# Patient Record
Sex: MALE | Race: WHITE | NOT HISPANIC OR LATINO | Employment: OTHER | URBAN - METROPOLITAN AREA
[De-identification: names, ages, dates, MRNs, and addresses within clinical notes are randomized per-mention and may not be internally consistent; named-entity substitution may affect disease eponyms.]

---

## 2017-06-14 NOTE — PATIENT DISCUSSION
LUCENTIS TX AND REPEAT EVERY 5 WKS X 2 - MILD EDEMA, VA DROPPED FROM 20/30 TO CF SINCE LAST L TX ABOUT 2 YEARS AGO - PT MISSED F/U AS HE HAS HAD HEALTH ISSUES - TO RESUME TX TO SEE IF IT IS POSSIBLE TO RECOVER SOME OF THE VA.

## 2017-06-14 NOTE — PROCEDURE NOTE: CLINICAL

## 2017-08-09 NOTE — PATIENT DISCUSSION
INCREASED - DID NOT SEE DEWAYNE AS DR Kim Karen OFFICE DOES NOT HAVE OXYGEN.  TO START LATANAPROST 8 9 17.

## 2017-08-09 NOTE — PATIENT DISCUSSION
ON 6 14 17 - LUCENTIS TX AND REPEAT EVERY 5 WKS X 2 - MILD EDEMA, VA DROPPED FROM 20/30 TO CF SINCE LAST L TX ABOUT 2 YEARS AGO - PT MISSED F/U AS HE HAS HAD HEALTH ISSUES - TO RESUME TX TO SEE IF IT IS POSSIBLE TO RECOVER SOME OF THE VA.

## 2017-08-16 NOTE — PROCEDURE NOTE: CLINICAL
PROCEDURE NOTE: Lucentis 0.5mg PFS OD. Diagnosis: Neovascular AMD with Active CNV. Anesthesia: Topical. Prep: Betadine Flush. Prior to injection, risks/benefits/alternatives discussed including but not limited to infection, loss of vision or eye, hemorrhage, cataract, glaucoma, retinal tears or detachment. The patient wished to proceed with treatment. Topical anesthesia was induced with Alcaine. Additional anesthesia was achieved using drop(s) or injection checked above. A drop of Povidone-iodine 5% ophthalmic solution was instilled over the injection site and in the inferior fornix. Betadine prep was performed. A single use prefilled syringe of intravitreal Lucentis 0.5mg/0.05ml was used and excess discarded. The needle was passed 3.0 mm posterior to the limbus in pseudophakic patients, and 3.5 mm posterior to the limbus in phakic patients. The remainder of the Lucentis 0.5mg in the single-use vial was then discarded in a medical waste disposal container. The eye was irrigated with sterile irrigating solution. Patient tolerated the procedure well. There were no complications. Post procedure instructions given. CF vision checked. Injection Time 235PM. The patient was instructed to return for re-evaluation in approximately 4-12 weeks depending on his/her condition and was told to call immediately if vision decreases and/or if his/her eye becomes red, painful, and/or light sensitive. The patient was instructed to go to the emergency room or call 911 if unable to reach the doctor within an hour or two of trying or calling. Martinez Buck

## 2017-08-16 NOTE — PATIENT DISCUSSION
INCREASED - DID NOT SEE DEWAYNE AS DR Ursula Campbell OFFICE DOES NOT HAVE OXYGEN.  TO START LATANAPROST 8 9 17.

## 2017-09-25 NOTE — PROCEDURE NOTE: CLINICAL
PROCEDURE NOTE: Lucentis 0.5mg PFS OD. Diagnosis: Neovascular AMD with Active CNV. Anesthesia: Akten Gel 3.5%. Prep: Betadine Flush. Prior to injection, risks/benefits/alternatives discussed including but not limited to infection, loss of vision or eye, hemorrhage, cataract, glaucoma, retinal tears or detachment. The patient wished to proceed with treatment. Topical anesthesia was induced with Alcaine. Additional anesthesia was achieved using drop(s) or injection checked above. A drop of Povidone-iodine 5% ophthalmic solution was instilled over the injection site and in the inferior fornix. Betadine prep was performed. A single use prefilled syringe of intravitreal Lucentis 0.5mg/0.05ml was used and excess discarded. The needle was passed 3.0 mm posterior to the limbus in pseudophakic patients, and 3.5 mm posterior to the limbus in phakic patients. The remainder of the Lucentis 0.5mg in the single-use vial was then discarded in a medical waste disposal container. The eye was irrigated with sterile irrigating solution. Patient tolerated the procedure well. There were no complications. Post procedure instructions given. CF vision checked. Injection Time 11:45AM. The patient was instructed to return for re-evaluation in approximately 4-12 weeks depending on his/her condition and was told to call immediately if vision decreases and/or if his/her eye becomes red, painful, and/or light sensitive. The patient was instructed to go to the emergency room or call 911 if unable to reach the doctor within an hour or two of trying or calling. Joyce Grimes

## 2017-10-30 NOTE — PATIENT DISCUSSION
INCREASED - DID NOT SEE DEWAYNE AS DR Ursula Campbell OFFICE DOES NOT HAVE OXYGEN.  LATANAPROST STARTED8 9 17.

## 2017-10-30 NOTE — PATIENT DISCUSSION
NO SIG IMPROVEMENT IN VA WITH L TX AND 5 WKS F/U ON 10 30 17- REVIEWED OPTIONS WITH PATIENT OF CONTINUING TX VS FOLLOWING - AS NO SIG IMPROVMENT PT HAS ELECTED TO HOLD ON FURTHER TREATMENT.

## 2018-04-23 NOTE — PATIENT DISCUSSION
INCREASED - DID NOT SEE DEWAYNE AS DR Radha Funez OFFICE DOES NOT HAVE OXYGEN.  LATANAPROST STARTED8 9 17.

## 2018-04-23 NOTE — PATIENT DISCUSSION
IOP STILL ON HIGH SIDE - RECOM EVAL DR HIGGINS TO TRY AND PRESERVE HIS PERIPHERAL VA - HAD LASER IN [facility] 2018.

## 2018-08-13 ENCOUNTER — ESTABLISHED COMPREHENSIVE EXAM (OUTPATIENT)
Dept: URBAN - METROPOLITAN AREA CLINIC 43 | Facility: CLINIC | Age: 73
End: 2018-08-13

## 2018-08-13 DIAGNOSIS — H52.203: ICD-10-CM

## 2018-08-13 DIAGNOSIS — H52.4: ICD-10-CM

## 2018-08-13 PROCEDURE — 92015 DETERMINE REFRACTIVE STATE: CPT

## 2018-08-13 PROCEDURE — 92014 COMPRE OPH EXAM EST PT 1/>: CPT

## 2018-08-13 ASSESSMENT — TONOMETRY
OD_IOP_MMHG: 18
OS_IOP_MMHG: 17

## 2018-08-13 ASSESSMENT — VISUAL ACUITY
OD_CC: J2
OS_CC: J1
OD_CC: 20/25-2
OS_CC: 20/25
OD_BAT: 20/50
OS_BAT: 20/40

## 2018-10-22 NOTE — PATIENT DISCUSSION
4 23 18 - NO SIG IMPROVEMENT IN VA WITH L TX AND 5 WKS F/U ON 10 30 17- REVIEWED OPTIONS WITH PATIENT OF CONTINUING TX VS FOLLOWING - AS NO SIG IMPROVMENT PT HAS ELECTED TO HOLD ON FURTHER TREATMENT.

## 2018-10-22 NOTE — PATIENT DISCUSSION
IOP STILL ON HIGH SIDE - RECOM EVAL DR HIGGINS TO TRY AND PRESERVE HIS PERIPHERAL VA - HAD LASER IN [facility] 2018.

## 2018-10-22 NOTE — PATIENT DISCUSSION
INCREASED - DID NOT SEE DEWAYNE AS DR Munoz Neighbor OFFICE DOES NOT HAVE OXYGEN.  LATANAPROST STARTED8 9 17.

## 2019-04-24 NOTE — PATIENT DISCUSSION
INCREASED - DID NOT SEE DEWAYNE AS DR Harmeet Rapp OFFICE DOES NOT HAVE OXYGEN.  LATANAPROST STARTED8 9 17.

## 2019-04-24 NOTE — PATIENT DISCUSSION
IOP STILL ON HIGH SIDE - RECOM EVAL DR HIGGINS TO TRY AND PRESERVE HIS PERIPHERAL VA - HAD LASER IN [facility] 2018.

## 2019-06-04 NOTE — PATIENT DISCUSSION
INCREASED - DID NOT SEE DEWAYNE AS  [facility] OFFICE DOES NOT HAVE OXYGEN.  TO START LATANAPROST 8 9 17. Return to Urgent Care or go to ER if symptoms worsen or fail to improve.  Follow up with PCP as recommended for further management.       Diaper Rash, Candida (Infant/Toddler)     Areas where Candida diaper rash can form.   Candida is type of yeast. It grows best in warm, moist areas. It is common for Candida to grow in the skin folds under a childs diaper. When there is an overgrowth of Candida, it can cause a rash called a Candida diaper rash.  The entire area under the diaper may be bright red. The borders of the rash may be raised. There may be smaller patches that blend in with the larger rash. The rash may have small bumps and pimples filled with pus. The scrotum in boys may be very red and scaly. The area will itch and cause the child to be fussy.  Candida diaper rash is most often treated with over-the-counter antifungal cream or ointment. The rash should clear a few days after starting the medicine. Infections that dont go away may need a prescription medicine. In rare cases, a bacterial infection can also occur.  Home care  Medicines  Your childs healthcare provider will recommend an antifungal cream or ointment for the diaper rash. He or she may also prescribe a medicine to help relieve itching. Follow all instructions for giving these medicines to your child. Apply a thick layer of cream or ointment on the rash. It can be left on the skin between diaper changes. You can apply more cream or ointment on top, if the area is clean.  General care  Follow these tips when caring for your child:  · Be sure to wash your hands well with soap and warm water before and after changing your childs diaper and applying any medicine.  · Check for soiled diapers regularly. Change your childs diaper as soon as you notice it is soiled. Gently pat the area clean with a warm, wet soft cloth. If you use soap, it should be gentle and scent-free. Topical barriers such as zinc oxide paste or petroleum jelly can be liberally  applied to help prevent urine and stool contact with the skin.  · Change your childs diaper at least once at night. Put the diaper on loosely.   · Use a breathable cover for cloth diapers instead of rubber pants. Slit the elastic legs or cover of a disposable diaper in a few places. This will allow air to reach your childs skin. Note: Disposable diapers may be preferred until the rash has healed.  · Allow your child to go without a diaper for periods of time. Exposing the skin to air will help it to heal.  · Dont overclean the affected skin areas. This can irritate the skin further. Also dont apply powders such as talc or cornstarch to the affected skin areas. Talc can be harmful to a childs lungs. Cornstarch can cause the Candida infection to get worse.  Follow-up care  Follow up with your childs healthcare provider, or as directed.  When to seek medical advice  Unless your child's healthcare provider advises otherwise, call the provider right away if:  · Your child is 3 months old or younger and has a fever of 100.4°F (38°C) or higher. (Seek treatment right away. Fever in a young baby can be a sign of a serious infection.)  · Your child is younger than 2 years of age and has a fever of 100.4°F (38°C) that lasts for more than 1 day.  · Your child is 2 years old or older and has a fever of 100.4°F (38°C) that continues for more than 3 days.  · Your child is of any age and has repeated fevers above 104°F (40°C).  Also call the provider right away if:  · Your child is fussier than normal or keeps crying and can't be soothed.  · Your childs symptoms worsen, or they dont get better with treatment.  · Your child develops new symptoms such as blisters, open sores, raw skin, or bleeding.  · Your child has unusual or foul-smelling drainage in the affected skin areas.  Date Last Reviewed: 7/26/2015  © 0324-3176 [employer]. [address] [place] [address], [place] [address]. All rights reserved. This information  is not intended as a substitute for professional medical care. Always follow your healthcare professional's instructions.        Oral Candida Infection (Thrush) in Your Child  Candida is a type of fungus. It is found naturally on the skin and in the mouth. If Candida grows out of control, it can cause mouth infection called thrush. Thrush is common in infants and children. Thrush is not a serious problem for a healthy child.  Whos at risk?  Thrush is common in infants and toddlers. Risk factors for infant thrush include:  · Very low birth weight  · Passing through the birth canal of a mother with a yeast infection  · Use of antibiotics  · Use of inhaled steroids, such as for asthma  · Frequent use of a pacifier  · Weakened immune system  Symptoms of thrush  Thrush causes creamy white patches to form on the tongue or inner cheeks. These patches can be painful and may bleed. Babies with thrush are often fussy and may have trouble feeding.  Treatment for thrush  A healthy baby with mild thrush may not need any treatment. More severe cases are likely to be treated with a liquid antifungal medicine. Or the medicine may be given as lozenges or pills. Follow the healthcare provider's instructions for giving this medicine to your child.  Breastfeeding mothers may develop thrush on their nipples. If you breastfeed, both you and your child will be treated. This is to prevent passing the infection back and forth.  Caring for your child at home  Make sure to do the following:  · Wash your hands well with warm water and soap before and after caring for your child. Have your child wash his or her hands often.  · If your child uses a pacifier, boil it for 5 to 10 minutes at least once a day.  · Wash drinking cups well using warm water and soap after each use.  · If your child takes inhaled corticosteroids, have your child rinse his or her mouth after taking the medicine. Also ask the child's healthcare provider about using a  spacer. This can help lessen the risk for thrush.  Your child can likely go to school or , unless the healthcare provider says otherwise.  When to call the healthcare provider  Call the healthcare provider right away if:  · Your child is 3 months old or younger and has a fever of 100.4°F (38°C) or higher. Get medical care right away. Fever in a young baby can be a sign of a dangerous infection.  · Your child is younger than 2 years of age and has a fever of 100.4°F (38°C) that continues for more than 1 day.  · Your child is 2 years old or older and has a fever of 100.4°F (38°C) that continues for more than 3 days.  · Your child is of any age and has repeated fevers above 104°F (40°C).  Also call the healthcare provider if your child:  · Stops eating or drinking  · Has pain that doesnt go away, or gets worse  · Has other symptoms that get worse  · Has repeated thrush infections   Date Last Reviewed: 10/1/2016  © 2642-1558 [employer]. [address] [place] [address], [place] [address]. All rights reserved. This information is not intended as a substitute for professional medical care. Always follow your healthcare professional's instructions.

## 2019-07-31 ENCOUNTER — ESTABLISHED COMPREHENSIVE EXAM (OUTPATIENT)
Dept: URBAN - METROPOLITAN AREA CLINIC 43 | Facility: CLINIC | Age: 74
End: 2019-07-31

## 2019-07-31 DIAGNOSIS — H52.203: ICD-10-CM

## 2019-07-31 DIAGNOSIS — H52.4: ICD-10-CM

## 2019-07-31 PROCEDURE — 92014 COMPRE OPH EXAM EST PT 1/>: CPT

## 2019-07-31 PROCEDURE — 92015 DETERMINE REFRACTIVE STATE: CPT

## 2019-07-31 ASSESSMENT — VISUAL ACUITY
OS_CC: 20/25
OS_CC: J2
OD_CC: 20/40-2
OD_CC: J3
OS_SC: 20/80-1
OD_SC: 20/200
OD_SC: >J12
OS_SC: >J12

## 2019-07-31 ASSESSMENT — TONOMETRY
OS_IOP_MMHG: 18
OD_IOP_MMHG: 16

## 2020-06-02 NOTE — PATIENT DISCUSSION
INCREASED - DID NOT SEE DEWAYNE AS DR Knapp Sham OFFICE DOES NOT HAVE OXYGEN.  LATANAPROST STARTED8 9 17.

## 2020-06-02 NOTE — PATIENT DISCUSSION
Reviewed OPTIONS including AVASTIN/EYLEA/LUCENTIS/BEOVU and patient wants to proceed with LUCENTIS treatment AND REPEAT EVERY 29 DAYS X 3 DOI.

## 2020-06-02 NOTE — PROCEDURE NOTE: CLINICAL
PROCEDURE NOTE: Lucentis 0.5mg PFS OS. Diagnosis: Neovascular AMD with Active CNV. Anesthesia: Lidocaine. Prep: Betadine Flush. Prior to injection, risks/benefits/alternatives discussed including but not limited to infection, loss of vision or eye, hemorrhage, cataract, glaucoma, retinal tears or detachment. The patient wished to proceed with treatment. Topical anesthesia was induced with Alcaine. Additional anesthesia was achieved using drop(s) or injection checked above. A drop of Povidone-iodine 5% ophthalmic solution was instilled over the injection site and in the inferior fornix. Betadine prep was performed. A single use prefilled syringe of intravitreal Lucentis 0.5mg/0.05ml was used and excess was disposed of as waste. The needle was passed 3.0 mm posterior to the limbus in pseudophakic patients, and 3.5 mm posterior to the limbus in phakic patients. Injection Time 5:00PM. Patient tolerated the procedure well. There were no complications. The eye was irrigated with sterile irrigating solution. Post procedure instructions given. The patient was instructed to use Artificial Tears q.i.d. p.r.n for comfort. The patient was instructed to return for re-evaluation in approximately 4-12 weeks depending on his/her condition and was told to call immediately if vision decreases and/or if his/her eye becomes red, painful, and/or light sensitive. The patient was instructed to go to the emergency room or call 911 if unable to reach the doctor within an hour or two of trying or calling. Cortes Fitzgerald

## 2020-06-02 NOTE — PATIENT DISCUSSION
IOP STILL ON HIGH SIDE - RECOM EVAL DR HIGGINS TO TRY AND PRESERVE HIS PERIPHERAL VA - HAD LASER IN [facility] 2018.

## 2020-07-01 NOTE — PATIENT DISCUSSION
INCREASED - DID NOT SEE DEWAYNE AS DR Maryan Joaquin OFFICE DOES NOT HAVE OXYGEN.  LATANAPROST STARTED8 9 17.

## 2020-07-01 NOTE — PROCEDURE NOTE: CLINICAL
PROCEDURE NOTE: Lucentis 0.5mg PFS OS. Diagnosis: Neovascular AMD with Active CNV. Anesthesia: Akten Gel 3.5%. Prep: Betadine Flush. Prior to injection, risks/benefits/alternatives discussed including but not limited to infection, loss of vision or eye, hemorrhage, cataract, glaucoma, retinal tears or detachment. The patient wished to proceed with treatment. Topical anesthesia was induced with Alcaine. Additional anesthesia was achieved using drop(s) or injection checked above. A drop of Povidone-iodine 5% ophthalmic solution was instilled over the injection site and in the inferior fornix. Betadine prep was performed. A single use prefilled syringe of intravitreal Lucentis 0.5mg/0.05ml was used and excess was disposed of as waste. The needle was passed 3.0 mm posterior to the limbus in pseudophakic patients, and 3.5 mm posterior to the limbus in phakic patients. Injection Time 3:30PM. Patient tolerated the procedure well. There were no complications. The eye was irrigated with sterile irrigating solution. Post procedure instructions given. The patient was instructed to use Artificial Tears q.i.d. p.r.n for comfort. The patient was instructed to return for re-evaluation in approximately 4-12 weeks depending on his/her condition and was told to call immediately if vision decreases and/or if his/her eye becomes red, painful, and/or light sensitive. The patient was instructed to go to the emergency room or call 911 if unable to reach the doctor within an hour or two of trying or calling. Maine Snell

## 2020-07-01 NOTE — PATIENT DISCUSSION
IOP STILL ON HIGH SIDE - RECOM EVAL DR HIGGINS TO TRY AND PRESERVE HIS PERIPHERAL VA - HAD LASER IN [facility] 2018.

## 2020-07-31 ENCOUNTER — ESTABLISHED COMPREHENSIVE EXAM (OUTPATIENT)
Dept: URBAN - METROPOLITAN AREA CLINIC 43 | Facility: CLINIC | Age: 75
End: 2020-07-31

## 2020-07-31 DIAGNOSIS — Z01.00: ICD-10-CM

## 2020-07-31 DIAGNOSIS — H52.203: ICD-10-CM

## 2020-07-31 DIAGNOSIS — H52.4: ICD-10-CM

## 2020-07-31 PROCEDURE — 92015 DETERMINE REFRACTIVE STATE: CPT

## 2020-07-31 PROCEDURE — 92014 COMPRE OPH EXAM EST PT 1/>: CPT

## 2020-07-31 ASSESSMENT — VISUAL ACUITY
OD_CC: 20/25-1
OS_CC: 20/25
OD_SC: <J12
OS_SC: 20/70
OS_SC: J12-
OS_CC: J2
OD_CC: J1
OD_SC: 20/400

## 2020-07-31 ASSESSMENT — TONOMETRY
OS_IOP_MMHG: 08
OD_IOP_MMHG: 08

## 2020-08-03 NOTE — PATIENT DISCUSSION
INCREASED - DID NOT SEE DEWAYNE AS DR Lito Shafer OFFICE DOES NOT HAVE OXYGEN.  LATANAPROST STARTED8 9 17.

## 2020-08-03 NOTE — PATIENT DISCUSSION
Physical Exam  Mallampati: II  TM Distance: >3 FB  Neck ROM: Full  Cardio Rhythm: Regular  Cardio Rate: Normal  cardiovascular exam normal  Breath sounds clear to auscultation:  Yes  pulmonary exam normal  abdominal exam normal  Dental Note: Multiple missing, none loose per patient      Anesthesia Plan  ASA Status: 3  Anesthesia Type: General  Induction: Intravenous  Preferred Airway Type: LMA  Reviewed: EKG, Nursing Notes, Beta Blocker Status, Medications, Past Med History, NPO Status, Problem List, Allergies, Patient Summary, DNR Status, Consultations, Lab Results and Pre-Induction Reassessment  The proposed anesthetic plan, including its risks and benefits, have been discussed with the Patient - along with the risks and benefits of alternatives.  Questions were encouraged and answered and the patient and/or representative agrees to proceed.  Blood Products: Not Anticipated      Anesthesia ROS/Med Hx    Overall Review:  Pts. EKG was reviewed     Pulmonary Review:    Pt. positive for pneumonia  Pt. positive for COPD   Pt. positive for asthmaThe patient is a current smoker.     Neuro/Psych Review:  Neuro/Psych Comments: Chronic low back pain  Pt. positive for seizures  Pt. positive for neuromuscular disease (Fibromyalgia, restless leg syndrome)  Pt. positive for psychiatric history (Anxiety/depression, insomnia)    Cardiovascular Review:    Pt. positive for CAD  Pt. positive for hypertension  Pt. positive for hyperlipidemia    End/Other Review:    Pt. positive for diabetes  Pt. positive for arthritis  Overall Review of Systems Comments:  Patient Active Problem List:     Prepatellar bursitis     DM (diabetes mellitus) (CMS/HCC)     HTN (hypertension)     HLD (hyperlipidemia)     Opioid abuse     Tobacco use disorder     Seizure (CMS/HCC)     Acute respiratory failure with hypoxia and hypercapnia (CMS/[facility])     Closed trimalleolar fracture of right ankle     Syndesmotic disruption of right ankle     Skin necrosis right  Recommended OBSERVATION and continued MONITORING for progression. ankle     Nonhealing surgical wound      DM (diabetes mellitus) (CMS/HCC)                              HTN (hypertension)                                            HLD (hyperlipidemia)                                          COPD (chronic obstructive pulmonary disease) (*               Fibromyalgia                                                  Obesity                                                       Onychomycosis                                                   Comment: x 10    RLS (restless legs syndrome)                                  Anxiety and depression                                          Comment: Chronic    Chronic insomnia                                              Chronic low back pain                                         Diabetic neuropathy (CMS/[facility])                                 Anxiety                                                       Depression                                                    Urinary incontinence                                          Pneumonia                                                     RAD (reactive airway disease)                                 Bronchitis                                                      Comment: Pt unsure of when    Arthritis                                                     Chronic pain                                                  Fracture                                                        Comment: lt ankle    Coronary artery disease

## 2020-08-03 NOTE — PATIENT DISCUSSION
IOP STILL ON HIGH SIDE - RECOM EVAL DR HIGGINS TO TRY AND PRESERVE HIS PERIPHERAL VA - HAD LASER IN [facility] 2018.

## 2020-08-03 NOTE — PROCEDURE NOTE: CLINICAL
PROCEDURE NOTE: Lucentis 0.5mg PFS OS. Diagnosis: Neovascular AMD with Active CNV. Anesthesia: Akten Gel 3.5%. Prep: Betadine Flush. Prior to injection, risks/benefits/alternatives discussed including but not limited to infection, loss of vision or eye, hemorrhage, cataract, glaucoma, retinal tears or detachment. The patient wished to proceed with treatment. Topical anesthesia was induced with Alcaine. Additional anesthesia was achieved using drop(s) or injection checked above. A drop of Povidone-iodine 5% ophthalmic solution was instilled over the injection site and in the inferior fornix. Betadine prep was performed. A single use prefilled syringe of intravitreal Lucentis 0.5mg/0.05ml was used and excess was disposed of as waste. The needle was passed 3.0 mm posterior to the limbus in pseudophakic patients, and 3.5 mm posterior to the limbus in phakic patients. Injection Time 3:45 PM. Patient tolerated the procedure well. There were no complications. The eye was irrigated with sterile irrigating solution. Post procedure instructions given. The patient was instructed to use Artificial Tears q.i.d. p.r.n for comfort. The patient was instructed to return for re-evaluation in approximately 4-12 weeks depending on his/her condition and was told to call immediately if vision decreases and/or if his/her eye becomes red, painful, and/or light sensitive. The patient was instructed to go to the emergency room or call 911 if unable to reach the doctor within an hour or two of trying or calling. Blade Whitley

## 2020-09-01 NOTE — PATIENT DISCUSSION
IOP STILL ON HIGH SIDE - RECOM EVAL DR HIGGINS TO TRY AND PRESERVE HIS PERIPHERAL VA - HAD LASER IN [facility] 2018.

## 2020-09-01 NOTE — PROCEDURE NOTE: CLINICAL

## 2020-09-01 NOTE — PATIENT DISCUSSION
INCREASED - DID NOT SEE DEWAYNE AS DR Bita Su OFFICE DOES NOT HAVE OXYGEN.  LATANAPROST STARTED8 9 17.

## 2020-09-30 NOTE — PATIENT DISCUSSION
IOP STILL ON HIGH SIDE - RECOM EVAL DR HIGGINS TO TRY AND PRESERVE HIS PERIPHERAL VA - HAD LASER IN [facility] 2018.

## 2020-09-30 NOTE — PROCEDURE NOTE: CLINICAL
PROCEDURE NOTE: Lucentis 0.5mg PFS OS. Diagnosis: Neovascular AMD with Active CNV. Anesthesia: Lidocaine. Prep: Betadine Flush. Prior to injection, risks/benefits/alternatives discussed including but not limited to infection, loss of vision or eye, hemorrhage, cataract, glaucoma, retinal tears or detachment. The patient wished to proceed with treatment. Topical anesthesia was induced with Alcaine. Additional anesthesia was achieved using drop(s) or injection checked above. A drop of Povidone-iodine 5% ophthalmic solution was instilled over the injection site and in the inferior fornix. Betadine prep was performed. A single use prefilled syringe of intravitreal Lucentis 0.5mg/0.05ml was used and excess was disposed of as waste. The needle was passed 3.0 mm posterior to the limbus in pseudophakic patients, and 3.5 mm posterior to the limbus in phakic patients. Injection Time 550. Patient tolerated the procedure well. There were no complications. The eye was irrigated with sterile irrigating solution. Post procedure instructions given. The patient was instructed to use Artificial Tears q.i.d. p.r.n for comfort. The patient was instructed to return for re-evaluation in approximately 4-12 weeks depending on his/her condition and was told to call immediately if vision decreases and/or if his/her eye becomes red, painful, and/or light sensitive. The patient was instructed to go to the emergency room or call 911 if unable to reach the doctor within an hour or two of trying or calling. Carol Batista

## 2020-09-30 NOTE — PATIENT DISCUSSION
INCREASED - DID NOT SEE DEWAYNE AS DR Dori Saunders OFFICE DOES NOT HAVE OXYGEN.  LATANAPROST STARTED8 9 17.

## 2020-11-02 NOTE — PATIENT DISCUSSION
IOP STILL ON HIGH SIDE - RECOM EVAL DR HIGGINS TO TRY AND PRESERVE HIS PERIPHERAL VA - HAD LASER IN [facility] 2018.

## 2020-11-02 NOTE — PROCEDURE NOTE: CLINICAL

## 2020-11-02 NOTE — PATIENT DISCUSSION
INCREASED - DID NOT SEE DEWAYNE AS DR Malou Madera OFFICE DOES NOT HAVE OXYGEN.  LATANAPROST STARTED8 9 17.

## 2020-11-03 ENCOUNTER — REFRACTION ONLY (OUTPATIENT)
Dept: URBAN - METROPOLITAN AREA CLINIC 43 | Facility: CLINIC | Age: 75
End: 2020-11-03

## 2020-11-03 DIAGNOSIS — H52.203: ICD-10-CM

## 2020-11-03 DIAGNOSIS — H52.03: ICD-10-CM

## 2020-11-03 DIAGNOSIS — H52.4: ICD-10-CM

## 2020-11-03 PROCEDURE — 92015GRNC REFRACTION GLASSES RECHECK - NO CHARGE

## 2020-11-03 ASSESSMENT — VISUAL ACUITY
OS_SC: J12
OS_CC: J1
OD_SC: <J12
OS_SC: 20/70+2
OD_CC: J1
OS_CC: 20/50-2
OD_CC: 20/20-1
OD_SC: 20/200+1

## 2021-01-13 NOTE — PATIENT DISCUSSION
INCREASED - DID NOT SEE DEWAYNE AS DR Lila Lovell OFFICE DOES NOT HAVE OXYGEN.  LATANAPROST STARTED8 9 17.

## 2021-01-13 NOTE — PATIENT DISCUSSION
Continue to MONITOR CLOSELY to determine the need for TREATMENT and INCREASE/DECREASE in length of time till next follow up visit. Well-developed, well nourished

## 2021-01-13 NOTE — PATIENT DISCUSSION
IOP STILL ON HIGH SIDE - RECOM EVAL DR HIGGINS TO TRY AND PRESERVE HIS PERIPHERAL VA - HAD LASER IN [facility] 2018.

## 2021-01-13 NOTE — PROCEDURE NOTE: CLINICAL
PROCEDURE NOTE: Lucentis 0.5mg PFS OS. Diagnosis: Neovascular AMD with Active CNV. Anesthesia: Akten Gel 3.5%. Prep: Betadine Flush. Prior to injection, risks/benefits/alternatives discussed including but not limited to infection, loss of vision or eye, hemorrhage, cataract, glaucoma, retinal tears or detachment. The patient wished to proceed with treatment. Topical anesthesia was induced with Alcaine. Additional anesthesia was achieved using drop(s) or injection checked above. A drop of Povidone-iodine 5% ophthalmic solution was instilled over the injection site and in the inferior fornix. Betadine prep was performed. A single use prefilled syringe of intravitreal Lucentis 0.5mg/0.05ml was used and excess was disposed of as waste. The needle was passed 3.0 mm posterior to the limbus in pseudophakic patients, and 3.5 mm posterior to the limbus in phakic patients. Injection Time 5:15PM. Patient tolerated the procedure well. There were no complications. The eye was irrigated with sterile irrigating solution. Post procedure instructions given. The patient was instructed to use Artificial Tears q.i.d. p.r.n for comfort. The patient was instructed to return for re-evaluation in approximately 4-12 weeks depending on his/her condition and was told to call immediately if vision decreases and/or if his/her eye becomes red, painful, and/or light sensitive. The patient was instructed to go to the emergency room or call 911 if unable to reach the doctor within an hour or two of trying or calling. Gutierrez Ayon

## 2021-04-01 NOTE — PATIENT DISCUSSION
IOP STILL ON HIGH SIDE - RECOM EVAL DR HIGGINS TO TRY AND PRESERVE HIS PERIPHERAL VA - HAD LASER IN [facility] 2018.

## 2021-04-01 NOTE — PATIENT DISCUSSION
INCREASED - DID NOT SEE DEWAYNE AS DR Byron Bernard OFFICE DOES NOT HAVE OXYGEN.  LATANAPROST STARTED8 9 17.

## 2021-04-02 ENCOUNTER — EST. PATIENT EMERGENCY (OUTPATIENT)
Dept: URBAN - METROPOLITAN AREA CLINIC 43 | Facility: CLINIC | Age: 76
End: 2021-04-02

## 2021-04-02 DIAGNOSIS — H04.123: ICD-10-CM

## 2021-04-02 DIAGNOSIS — H01.02B: ICD-10-CM

## 2021-04-02 DIAGNOSIS — H01.02A: ICD-10-CM

## 2021-04-02 PROCEDURE — 92012 INTRM OPH EXAM EST PATIENT: CPT

## 2021-04-02 RX ORDER — NEOMYCIN SULFATE, POLYMYXIN B SULFATE AND DEXAMETHASONE 3.5; 10000; 1 MG/ML; [USP'U]/ML; MG/ML
1 SUSPENSION OPHTHALMIC
Start: 2021-04-02 | End: 2021-04-16

## 2021-04-02 ASSESSMENT — VISUAL ACUITY
OD_CC: 20/30+1
OS_CC: 20/30+2

## 2021-07-08 NOTE — PATIENT DISCUSSION
IOP STILL ON HIGH SIDE - RECOM EVAL DR HIGGINS TO TRY AND PRESERVE HIS PERIPHERAL VA - HAD LASER IN [facility] 2018.

## 2021-07-08 NOTE — PATIENT DISCUSSION
INCREASED - DID NOT SEE DEWAYNE AS DR Babita Bernstein OFFICE DOES NOT HAVE OXYGEN.  LATANAPROST STARTED8 9 17.

## 2021-08-16 ENCOUNTER — ESTABLISHED COMPREHENSIVE EXAM (OUTPATIENT)
Dept: URBAN - METROPOLITAN AREA CLINIC 43 | Facility: CLINIC | Age: 76
End: 2021-08-16

## 2021-08-16 DIAGNOSIS — Z01.00: ICD-10-CM

## 2021-08-16 DIAGNOSIS — H52.4: ICD-10-CM

## 2021-08-16 DIAGNOSIS — H52.203: ICD-10-CM

## 2021-08-16 PROCEDURE — 92015 DETERMINE REFRACTIVE STATE: CPT

## 2021-08-16 PROCEDURE — 92014 COMPRE OPH EXAM EST PT 1/>: CPT

## 2021-08-16 ASSESSMENT — VISUAL ACUITY
OS_SC: J14
OD_SC: J14
OD_CC: 20/25
OS_SC: 20/100
OD_SC: 20/80-2
OS_CC: 20/25-1
OS_CC: J1
OD_CC: J1

## 2021-08-16 ASSESSMENT — TONOMETRY
OS_IOP_MMHG: 12
OD_IOP_MMHG: 10

## 2022-04-08 NOTE — PATIENT DISCUSSION
4 24 19 MILD ^ IOP 21/21- RECOM F/U DR Castro Pole. [facility]  from [address] [place] [address] [facility]  from [address] [place] [address] MARY JO [facility]  from [address] [place] [address] MARY JO MARY JO [facility]  from [address] [place] [address]

## 2022-06-28 ENCOUNTER — EMERGENCY VISIT (OUTPATIENT)
Dept: URBAN - METROPOLITAN AREA CLINIC 43 | Facility: CLINIC | Age: 77
End: 2022-06-28

## 2022-06-28 DIAGNOSIS — H01.02A: ICD-10-CM

## 2022-06-28 DIAGNOSIS — H01.02B: ICD-10-CM

## 2022-06-28 DIAGNOSIS — H04.123: ICD-10-CM

## 2022-06-28 PROCEDURE — 92012 INTRM OPH EXAM EST PATIENT: CPT

## 2022-06-28 RX ORDER — NEOMYCIN SULFATE, POLYMYXIN B SULFATE AND DEXAMETHASONE 3.5; 10000; 1 MG/ML; [USP'U]/ML; MG/ML
1 SUSPENSION OPHTHALMIC
Start: 2022-06-28 | End: 2022-07-12

## 2022-06-28 ASSESSMENT — VISUAL ACUITY
OS_CC: 20/25+2
OD_CC: 20/25-1

## 2022-06-28 ASSESSMENT — TONOMETRY: OS_IOP_MMHG: 8

## 2022-08-23 ENCOUNTER — COMPREHENSIVE EXAM (OUTPATIENT)
Dept: URBAN - METROPOLITAN AREA CLINIC 43 | Facility: CLINIC | Age: 77
End: 2022-08-23

## 2022-08-23 DIAGNOSIS — H52.4: ICD-10-CM

## 2022-08-23 DIAGNOSIS — H52.03: ICD-10-CM

## 2022-08-23 DIAGNOSIS — Z01.00: ICD-10-CM

## 2022-08-23 PROCEDURE — 92015 DETERMINE REFRACTIVE STATE: CPT

## 2022-08-23 PROCEDURE — 92014 COMPRE OPH EXAM EST PT 1/>: CPT

## 2022-08-23 ASSESSMENT — VISUAL ACUITY
OS_SC: J14
OD_CC: 20/25
OS_SC: 20/80-1
OS_CC: J1
OD_SC: J14
OS_CC: 20/30
OD_SC: 20/80-1
OD_CC: J1

## 2022-08-23 ASSESSMENT — TONOMETRY
OD_IOP_MMHG: 11
OS_IOP_MMHG: 11

## 2022-10-31 NOTE — PATIENT DISCUSSION
IMPROVED IOP 10 22 18. Patient requests all Lab, Cardiology, and Radiology Results on their Discharge Instructions

## 2023-09-29 ENCOUNTER — COMPREHENSIVE EXAM (OUTPATIENT)
Dept: URBAN - METROPOLITAN AREA CLINIC 43 | Facility: CLINIC | Age: 78
End: 2023-09-29

## 2023-09-29 DIAGNOSIS — H52.4: ICD-10-CM

## 2023-09-29 DIAGNOSIS — H52.03: ICD-10-CM

## 2023-09-29 DIAGNOSIS — Z01.00: ICD-10-CM

## 2023-09-29 PROCEDURE — 92015 DETERMINE REFRACTIVE STATE: CPT

## 2023-09-29 PROCEDURE — 92014 COMPRE OPH EXAM EST PT 1/>: CPT

## 2023-09-29 RX ORDER — NEOMYCIN SULFATE, POLYMYXIN B SULFATE AND DEXAMETHASONE 3.5; 10000; 1 MG/ML; [USP'U]/ML; MG/ML: 1 SUSPENSION OPHTHALMIC TWICE A DAY

## 2023-09-29 ASSESSMENT — VISUAL ACUITY
OU_SC: 20/50-2
OD_CC: 20/25-2
OU_CC: J1
OD_CC: J1
OS_CC: 20/20
OS_CC: J1
OU_SC: J12
OU_CC: 20/20
OS_SC: J14
OD_SC: J14
OD_SC: 20/80
OS_SC: 20/100

## 2023-09-29 ASSESSMENT — TONOMETRY
OD_IOP_MMHG: 13
OS_IOP_MMHG: 11

## 2024-05-24 NOTE — PATIENT DISCUSSION
Continue current management at this time. Received inbox message on 5/20/2024 from specialty pharmacy stating medication was approved and they would notify once delivery has been arranged. Sent message back to specialty pharmacy asking how should proceed as pt called stating they have not been in contact. Awaiting response and will update pt and spouse once more information is obtained.

## 2024-09-20 ENCOUNTER — COMPREHENSIVE EXAM (OUTPATIENT)
Dept: URBAN - METROPOLITAN AREA CLINIC 43 | Facility: CLINIC | Age: 79
End: 2024-09-20

## 2024-09-20 DIAGNOSIS — H52.03: ICD-10-CM

## 2024-09-20 DIAGNOSIS — Z01.00: ICD-10-CM

## 2024-09-20 DIAGNOSIS — H52.4: ICD-10-CM

## 2024-09-20 PROCEDURE — 92014 COMPRE OPH EXAM EST PT 1/>: CPT

## 2024-09-20 PROCEDURE — 92015 DETERMINE REFRACTIVE STATE: CPT

## 2025-06-03 ENCOUNTER — EMERGENCY VISIT (OUTPATIENT)
Age: 80
End: 2025-06-03

## 2025-06-03 DIAGNOSIS — H02.051: ICD-10-CM

## 2025-06-03 DIAGNOSIS — H01.02A: ICD-10-CM

## 2025-06-03 DIAGNOSIS — H01.02B: ICD-10-CM

## 2025-06-03 DIAGNOSIS — H04.123: ICD-10-CM

## 2025-06-03 PROCEDURE — 67820 REVISE EYELASHES: CPT

## 2025-06-03 PROCEDURE — 99213 OFFICE O/P EST LOW 20 MIN: CPT | Mod: 25
